# Patient Record
Sex: FEMALE | Race: BLACK OR AFRICAN AMERICAN | URBAN - METROPOLITAN AREA
[De-identification: names, ages, dates, MRNs, and addresses within clinical notes are randomized per-mention and may not be internally consistent; named-entity substitution may affect disease eponyms.]

---

## 2023-03-02 ENCOUNTER — OFFICE VISIT (OUTPATIENT)
Dept: URBAN - METROPOLITAN AREA CLINIC 74 | Facility: CLINIC | Age: 83
End: 2023-03-02
Payer: MEDICARE

## 2023-03-02 ENCOUNTER — LAB OUTSIDE AN ENCOUNTER (OUTPATIENT)
Dept: URBAN - METROPOLITAN AREA CLINIC 74 | Facility: CLINIC | Age: 83
End: 2023-03-02

## 2023-03-02 VITALS
SYSTOLIC BLOOD PRESSURE: 126 MMHG | TEMPERATURE: 98.2 F | DIASTOLIC BLOOD PRESSURE: 78 MMHG | BODY MASS INDEX: 30.21 KG/M2 | HEIGHT: 70 IN | HEART RATE: 78 BPM | WEIGHT: 211 LBS

## 2023-03-02 DIAGNOSIS — R07.89 COSTOCHONDRAL PAIN: ICD-10-CM

## 2023-03-02 DIAGNOSIS — Z79.01 CHRONIC ANTICOAGULATION: ICD-10-CM

## 2023-03-02 DIAGNOSIS — Z86.711 HISTORY OF PULMONARY EMBOLISM: ICD-10-CM

## 2023-03-02 DIAGNOSIS — R10.11 RUQ PAIN: ICD-10-CM

## 2023-03-02 DIAGNOSIS — K59.01 CONSTIPATION BY DELAYED COLONIC TRANSIT: ICD-10-CM

## 2023-03-02 PROBLEM — 161512007: Status: ACTIVE | Noted: 2023-03-02

## 2023-03-02 PROBLEM — 301717006: Status: ACTIVE | Noted: 2023-03-02

## 2023-03-02 PROBLEM — 88111009: Status: ACTIVE | Noted: 2023-03-02

## 2023-03-02 PROBLEM — 161973001: Status: ACTIVE | Noted: 2023-03-02

## 2023-03-02 PROBLEM — 448265002: Status: ACTIVE | Noted: 2023-03-02

## 2023-03-02 PROBLEM — 711150003: Status: ACTIVE | Noted: 2023-03-02

## 2023-03-02 PROCEDURE — 99204 OFFICE O/P NEW MOD 45 MIN: CPT | Performed by: INTERNAL MEDICINE

## 2023-03-02 PROCEDURE — 99244 OFF/OP CNSLTJ NEW/EST MOD 40: CPT | Performed by: INTERNAL MEDICINE

## 2023-03-02 NOTE — HPI-TODAY'S VISIT:
The patient is an 82-year-old female.  The patient was referred by Dr. Nadine Ballesteros for consultation, a copy of these document is being forwarded to the referring physician.  The patient presents to the office stating that for the past 10 to 12 months she has experienced discomfort over the right upper quadrant, along with it she has noticed that there is an uneven abdominal configuration and claims that there is some distention over the right upper hydrant, the patient did complain of discomfort when turning her chest, at times when taking a deep breath, the discomfort seems to be dull in all related to position, unrelated to defecation except for the slight bloating that improves after a good bowel movement.  The patient denied having any upper GI symptoms such as dysphagia, odynophagia, nausea, vomiting, heartburn.  She denied having any diarrhea but does state that she has to take laxatives to defecate, she has been taking Senokot to empty out her colon.  The patient claims that she had a colonoscopy many years ago certainly not in the last 10 years and is reluctant to have one in view of her age.  The patient had a recent right upper quadrant ultrasound performed on February 28, 2023 which revealed a normal liver, normal gallbladder, the portion of the pancreas seen was normal bile ducts were unremarkable, kidneys were normal and so was the spleen.  A CT of the chest performed December 7, 2022 failed to show any pulmonary emboli, there was no pleural or pericardial fluid, no significant hilar or mediastinal or axillary lymphadenopathy.  The patient was found to have bilateral thyroid nodules.  The upper abdomen revealed a 1 cm low-attenuation lesion in the posterior upper pole of the right kidney most probably benign.  The patient had diverticulosis in the descending colon with no evidence of diverticulitis.  Laboratory on December 2022 revealed a normal renal function, calcium 10.6 with a normal albumin normal EGFR, elevated triglycerides, the patient's H&H 14.8 and 46.3 with a normal white cell count and normal differential.  The patient will be scheduled to get a CT of the abdomen and pelvis with contrast, we will obtain a Hemosure, the patient will return for a follow-up visit after completion of testing.  It seems that most of the pain might be costochondral and muscle related.  We will deal with the constipation once we have the CT scan.

## 2023-03-03 ENCOUNTER — OFFICE VISIT (OUTPATIENT)
Dept: URBAN - METROPOLITAN AREA CLINIC 73 | Facility: CLINIC | Age: 83
End: 2023-03-03
Payer: MEDICARE

## 2023-03-03 DIAGNOSIS — K59.01 CONSTIPATION BY DELAYED COLONIC TRANSIT: ICD-10-CM

## 2023-03-03 PROCEDURE — G0328 FECAL BLOOD SCRN IMMUNOASSAY: HCPCS | Performed by: INTERNAL MEDICINE

## 2023-03-07 PROBLEM — 35298007: Status: ACTIVE | Noted: 2023-03-02

## 2023-04-20 ENCOUNTER — OFFICE VISIT (OUTPATIENT)
Dept: URBAN - METROPOLITAN AREA CLINIC 74 | Facility: CLINIC | Age: 83
End: 2023-04-20
Payer: MEDICARE

## 2023-04-20 VITALS
TEMPERATURE: 97.6 F | DIASTOLIC BLOOD PRESSURE: 66 MMHG | SYSTOLIC BLOOD PRESSURE: 142 MMHG | WEIGHT: 213.4 LBS | OXYGEN SATURATION: 97 % | HEART RATE: 67 BPM | HEIGHT: 70 IN | BODY MASS INDEX: 30.55 KG/M2

## 2023-04-20 DIAGNOSIS — Z86.711 HISTORY OF PULMONARY EMBOLISM: ICD-10-CM

## 2023-04-20 DIAGNOSIS — R19.4 CHANGE IN BOWEL HABIT: ICD-10-CM

## 2023-04-20 DIAGNOSIS — R07.89 COSTOCHONDRAL PAIN: ICD-10-CM

## 2023-04-20 DIAGNOSIS — K59.01 CONSTIPATION BY DELAYED COLONIC TRANSIT: ICD-10-CM

## 2023-04-20 DIAGNOSIS — Z79.01 CHRONIC ANTICOAGULATION: ICD-10-CM

## 2023-04-20 DIAGNOSIS — R10.811 RIGHT UPPER QUADRANT ABDOMINAL TENDERNESS WITHOUT REBOUND TENDERNESS: ICD-10-CM

## 2023-04-20 DIAGNOSIS — R10.11 RUQ PAIN: ICD-10-CM

## 2023-04-20 PROCEDURE — 99214 OFFICE O/P EST MOD 30 MIN: CPT | Performed by: INTERNAL MEDICINE

## 2023-04-20 NOTE — HPI-TODAY'S VISIT:
The patient is an 82-year-old female.  The patient was referred by Dr. Nadine Ballesteros for consultation, a copy of these document is being forwarded to the referring physician.  The patient presents to the office stating that for the past 10 to 12 months she has experienced discomfort over the right upper quadrant, along with it she has noticed that there is an uneven abdominal configuration and claims that there is some distention over the right upper hydrant, the patient did complain of discomfort when turning her chest, at times when taking a deep breath, the discomfort seems to be dull in all related to position, unrelated to defecation except for the slight bloating that improves after a good bowel movement.  The patient denied having any upper GI symptoms such as dysphagia, odynophagia, nausea, vomiting, heartburn.  She denied having any diarrhea but does state that she has to take laxatives to defecate, she has been taking Senokot to empty out her colon.  The patient claims that she had a colonoscopy many years ago certainly not in the last 10 years and is reluctant to have one in view of her age.  The patient had a recent right upper quadrant ultrasound performed on February 28, 2023 which revealed a normal liver, normal gallbladder, the portion of the pancreas seen was normal bile ducts were unremarkable, kidneys were normal and so was the spleen.  A CT of the chest performed December 7, 2022 failed to show any pulmonary emboli, there was no pleural or pericardial fluid, no significant hilar or mediastinal or axillary lymphadenopathy.  The patient was found to have bilateral thyroid nodules.  The upper abdomen revealed a 1 cm low-attenuation lesion in the posterior upper pole of the right kidney most probably benign.  The patient had diverticulosis in the descending colon with no evidence of diverticulitis.  Laboratory on December 2022 revealed a normal renal function, calcium 10.6 with a normal albumin normal EGFR, elevated triglycerides, the patient's H&H 14.8 and 46.3 with a normal white cell count and normal differential.  The patient will be scheduled to get a CT of the abdomen and pelvis with contrast, we will obtain a Hemosure, the patient will return for a follow-up visit after completion of testing.  It seems that most of the pain might be costochondral and muscle related.  We will deal with the constipation once we have the CT scan.  Today  April 20, 2023 the patient returns for a follow-up visit, the patient was last seen in the office on March 2, 2023 with right upper quadrant abdominal pain, right upper quadrant tenderness without rebound, costochondral pain, change in bowel habits, constipation due to delayed colonic transit, the patient has a history of pulmonary embolism on chronic anticoagulation.  At the time of the visit the patient complained of 10 to 12 months of discomfort over the right upper quadrant, she noticed that there was abdominal distention, she located the distention over the right upper quadrant, ithe pain radiated towards the chest, it occurred when taking deep breaths, it appeared to be dull and at times related to position, unrelated to defecation, the patient did complain of slight bloating which improved after a good bowel movement.  The patient denies having any upper GI symptoms such as dysphagia, odynophagia, nausea vomiting or heartburn.  The patient denies having any diarrhea but did take laxatives to defecate mostly Senokot.  The patient stated that she had a colonoscopy many years before most probably over 11 was reluctant to have a repeat colonoscopy in view of her age.  The patient had a right upper quadrant ultrasound performed February 28, 2023 which revealed a normal liver, normal gallbladder, a portion of the pancreas visualized was normal, bile ducts were unremarkable and the right kidney and spleen were normal.  A CT of the chest performed December 7, 2022 failed to show any pulmonary emboli, there was no pleural effusion or pericardial fluid, no hilar or mediastinal or axillary lymphadenopathy.  The patient did have bilateral thyroid nodules.  The upper abdomen revealed a 1 cm low-attenuation lesion in the posterior upper pole in the right kidney most probably benign cyst.  The patient had diverticulosis in the descending colon with no evidence of diverticulitis.  Laboratory in December 2022 revealed an H&H of 14.8 and 46.3 with a normal white cell count and normal differential, the patient had normal renal function, calcium 10.6.  The patient was scheduled to have a CT of the abdomen and pelvis with contrast, we ordered a Hemosure.  The Hemosure was performed on March 3, 2023 and it was negative, the CT was performed on March 24, 2023, it revealed mild lower lung disease, the liver, gallbladder, biliary tree, pancreas, spleen, adrenals, kidneys, bladder were normal, the stomach had a normal appearance and so did the small bowel and the colon, there was no evidence of any appendiceal inflammatory change, there was no celiac or mesenteric or aorto significant changes, the portal vein was patent, there was no lymphadenopathy.  Today the patient returns to the office stating that she was seen in the emergency room on April 3, 2023 with constipation and back pain.  At the time the patient had normal vital signs, complaint of abdominal discomfort, and lower back pain for 2 days.  The patient claimed that she was having difficulty having BMs despite having a prescription for lactulose to be taken 7.5 mL every 8 hours and Colace.  The patient complaint of some abdominal distention.  The patient had a CT of the abdomen and pelvis which revealed several hypodensities on the right kidney too small to be characterized possible cysts, a previous hysterectomy with slight extension of the left renal pelvis and proximal ureter although no stones were seen on the left ureter, there was a small oval fluid collection in the left aspect of the vagina, they could not clearly determine what it was and was advised to see gynecology.  Bowels appear to be normal, the patient had an old L2 compression fracture.  Laboratory at this time revealed a normal CBC, H&H 13.6 and 42.6 with a white cell count of 4.19.  The differential showed a slightly low monocyte count of 0.25 and absolute eos of 0.07.  Chemistries revealed a glucose of 110, creatinine 1.01 with a normal BUN of 12, the EGFR was slightly low at 56, the patient had a normal lipase of 16.  LFTs were normal and calcium was 10.5 with a albumin of 4.1.  During the CAT scan the patient also was identified to have a right pericardiac node slightly enlarged at 6 mm, there were no other enlarged axillary or mediastinal nodes.  The patient had atherosclerosis.  Today the patient states that she has increased her fiber intake and water intake and is defecating more regularly.  Ever since she has been feeling better.  The patient continues to complain of some unevenness on the fat in between the right upper quadrant and the left upper quadrant where she thinks that the amount of fat over the right upper quadrant is larger and that she can feel some induration which at times hurts.  I was unable to palpate any significant difference in between the left upper quadrant and the right upper quadrant but the fat distribution favors the right upper quadrant.  The patient has been recommended to return to primary care to have the very cardiac lymph nodes evaluated, she should be seen by OB/GYN for the fluid collection determined on the previous CT scan around the vagina and will remain on current medications and diet and will return to see us on an as-needed basis.

## 2023-05-04 ENCOUNTER — OFFICE VISIT (OUTPATIENT)
Dept: URBAN - METROPOLITAN AREA CLINIC 74 | Facility: CLINIC | Age: 83
End: 2023-05-04

## 2024-01-25 ENCOUNTER — DASHBOARD ENCOUNTERS (OUTPATIENT)
Age: 84
End: 2024-01-25

## 2024-01-29 ENCOUNTER — OFFICE VISIT (OUTPATIENT)
Dept: URBAN - METROPOLITAN AREA CLINIC 74 | Facility: CLINIC | Age: 84
End: 2024-01-29

## 2024-01-29 NOTE — HPI-TODAY'S VISIT:
The patient is an 82-year-old female.  The patient was referred by Dr. Nadine Ballesteros for consultation, a copy of these document is being forwarded to the referring physician.  The patient presents to the office stating that for the past 10 to 12 months she has experienced discomfort over the right upper quadrant, along with it she has noticed that there is an uneven abdominal configuration and claims that there is some distention over the right upper hydrant, the patient did complain of discomfort when turning her chest, at times when taking a deep breath, the discomfort seems to be dull in all related to position, unrelated to defecation except for the slight bloating that improves after a good bowel movement.  The patient denied having any upper GI symptoms such as dysphagia, odynophagia, nausea, vomiting, heartburn.  She denied having any diarrhea but does state that she has to take laxatives to defecate, she has been taking Senokot to empty out her colon.  The patient claims that she had a colonoscopy many years ago certainly not in the last 10 years and is reluctant to have one in view of her age.  The patient had a recent right upper quadrant ultrasound performed on February 28, 2023 which revealed a normal liver, normal gallbladder, the portion of the pancreas seen was normal bile ducts were unremarkable, kidneys were normal and so was the spleen.  A CT of the chest performed December 7, 2022 failed to show any pulmonary emboli, there was no pleural or pericardial fluid, no significant hilar or mediastinal or axillary lymphadenopathy.  The patient was found to have bilateral thyroid nodules.  The upper abdomen revealed a 1 cm low-attenuation lesion in the posterior upper pole of the right kidney most probably benign.  The patient had diverticulosis in the descending colon with no evidence of diverticulitis.  Laboratory on December 2022 revealed a normal renal function, calcium 10.6 with a normal albumin normal EGFR, elevated triglycerides, the patient's H&H 14.8 and 46.3 with a normal white cell count and normal differential.  The patient will be scheduled to get a CT of the abdomen and pelvis with contrast, we will obtain a Hemosure, the patient will return for a follow-up visit after completion of testing.  It seems that most of the pain might be costochondral and muscle related.  We will deal with the constipation once we have the CT scan.  Today  April 20, 2023 the patient returns for a follow-up visit, the patient was last seen in the office on March 2, 2023 with right upper quadrant abdominal pain, right upper quadrant tenderness without rebound, costochondral pain, change in bowel habits, constipation due to delayed colonic transit, the patient has a history of pulmonary embolism on chronic anticoagulation.  At the time of the visit the patient complained of 10 to 12 months of discomfort over the right upper quadrant, she noticed that there was abdominal distention, she located the distention over the right upper quadrant, ithe pain radiated towards the chest, it occurred when taking deep breaths, it appeared to be dull and at times related to position, unrelated to defecation, the patient did complain of slight bloating which improved after a good bowel movement.  The patient denies having any upper GI symptoms such as dysphagia, odynophagia, nausea vomiting or heartburn.  The patient denies having any diarrhea but did take laxatives to defecate mostly Senokot.  The patient stated that she had a colonoscopy many years before most probably over 11 was reluctant to have a repeat colonoscopy in view of her age.  The patient had a right upper quadrant ultrasound performed February 28, 2023 which revealed a normal liver, normal gallbladder, a portion of the pancreas visualized was normal, bile ducts were unremarkable and the right kidney and spleen were normal.  A CT of the chest performed December 7, 2022 failed to show any pulmonary emboli, there was no pleural effusion or pericardial fluid, no hilar or mediastinal or axillary lymphadenopathy.  The patient did have bilateral thyroid nodules.  The upper abdomen revealed a 1 cm low-attenuation lesion in the posterior upper pole in the right kidney most probably benign cyst.  The patient had diverticulosis in the descending colon with no evidence of diverticulitis.  Laboratory in December 2022 revealed an H&H of 14.8 and 46.3 with a normal white cell count and normal differential, the patient had normal renal function, calcium 10.6.  The patient was scheduled to have a CT of the abdomen and pelvis with contrast, we ordered a Hemosure.  The Hemosure was performed on March 3, 2023 and it was negative, the CT was performed on March 24, 2023, it revealed mild lower lung disease, the liver, gallbladder, biliary tree, pancreas, spleen, adrenals, kidneys, bladder were normal, the stomach had a normal appearance and so did the small bowel and the colon, there was no evidence of any appendiceal inflammatory change, there was no celiac or mesenteric or aorto significant changes, the portal vein was patent, there was no lymphadenopathy.  Today the patient returns to the office stating that she was seen in the emergency room on April 3, 2023 with constipation and back pain.  At the time the patient had normal vital signs, complaint of abdominal discomfort, and lower back pain for 2 days.  The patient claimed that she was having difficulty having BMs despite having a prescription for lactulose to be taken 7.5 mL every 8 hours and Colace.  The patient complaint of some abdominal distention.  The patient had a CT of the abdomen and pelvis which revealed several hypodensities on the right kidney too small to be characterized possible cysts, a previous hysterectomy with slight extension of the left renal pelvis and proximal ureter although no stones were seen on the left ureter, there was a small oval fluid collection in the left aspect of the vagina, they could not clearly determine what it was and was advised to see gynecology.  Bowels appear to be normal, the patient had an old L2 compression fracture.  Laboratory at this time revealed a normal CBC, H&H 13.6 and 42.6 with a white cell count of 4.19.  The differential showed a slightly low monocyte count of 0.25 and absolute eos of 0.07.  Chemistries revealed a glucose of 110, creatinine 1.01 with a normal BUN of 12, the EGFR was slightly low at 56, the patient had a normal lipase of 16.  LFTs were normal and calcium was 10.5 with a albumin of 4.1.  During the CAT scan the patient also was identified to have a right pericardiac node slightly enlarged at 6 mm, there were no other enlarged axillary or mediastinal nodes.  The patient had atherosclerosis.  Today the patient states that she has increased her fiber intake and water intake and is defecating more regularly.  Ever since she has been feeling better.  The patient continues to complain of some unevenness on the fat in between the right upper quadrant and the left upper quadrant where she thinks that the amount of fat over the right upper quadrant is larger and that she can feel some induration which at times hurts.  I was unable to palpate any significant difference in between the left upper quadrant and the right upper quadrant but the fat distribution favors the right upper quadrant.  The patient has been recommended to return to primary care to have the very cardiac lymph nodes evaluated, she should be seen by OB/GYN for the fluid collection determined on the previous CT scan around the vagina and will remain on current medications and diet and will return to see us on an as-needed basis.  Today January 29, 2024 the patient returns for a follow-up visit, the patient was last seen on April 20, 2023 with right upper quadrant abdominal pain, tenderness on the right upper quadrant, costochondral pain, change in bowel habits, constipation due to delayed colonic transit, history of pulmonary embolism on chronic anticoagulation.At the time of the last visit the patient stated that after increasing the fiber intake the patient started defecating regularly and was feeling better.  The patient did complain of unevenness on the abdominal fat in between the right upper quadrant and left upper quadrant thinking that the fat over the right upper quadrant was larger and on palpation felt some induration which at times is hard.  On my exam I was unable to find a significant difference. The fat distribution favors the right upper quadrant. The patient had a CT of the chest on March 2023 which revealed mildly enlarged main pulmonary outflow tract noted.  Possibly due to pulmonary arterial hypertension.  The patient was found to have colonic diverticulosis in the descending colon, there was a 2 cm low-attenuation lesion involving the right lobe of the thyroid, an ultrasound performed in 2019 revealed bilateral thyroid nodules the largest 1 on the right lobe measuring 1.8 x 1.4 cm. A CT of the abdomen and pelvis performed March 27, 2023 revealed a normal liver, gallbladder, biliary tree, pancreas, spleen, adrenals, kidneys, urinary bladder, normal colon small bowel stomach.  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 A Hemosure on March 2023 was negative.

## 2024-09-26 ENCOUNTER — OFFICE VISIT (OUTPATIENT)
Dept: URBAN - METROPOLITAN AREA CLINIC 74 | Facility: CLINIC | Age: 84
End: 2024-09-26
Payer: MEDICARE

## 2024-09-26 VITALS
BODY MASS INDEX: 30.21 KG/M2 | OXYGEN SATURATION: 95 % | SYSTOLIC BLOOD PRESSURE: 132 MMHG | WEIGHT: 211 LBS | HEIGHT: 70 IN | HEART RATE: 50 BPM | DIASTOLIC BLOOD PRESSURE: 80 MMHG

## 2024-09-26 DIAGNOSIS — Z79.01 CHRONIC ANTICOAGULATION: ICD-10-CM

## 2024-09-26 DIAGNOSIS — Z86.711 HISTORY OF PULMONARY EMBOLISM: ICD-10-CM

## 2024-09-26 DIAGNOSIS — R19.4 CHANGE IN BOWEL HABIT: ICD-10-CM

## 2024-09-26 DIAGNOSIS — R19.00 ABDOMINAL WALL BULGE: ICD-10-CM

## 2024-09-26 DIAGNOSIS — R10.11 RUQ PAIN: ICD-10-CM

## 2024-09-26 PROBLEM — 118928003: Status: ACTIVE | Noted: 2024-09-26

## 2024-09-26 PROCEDURE — 99213 OFFICE O/P EST LOW 20 MIN: CPT | Performed by: INTERNAL MEDICINE

## 2024-09-26 RX ORDER — TRAMADOL HYDROCHLORIDE 50 MG/1
1 TABLET AS NEEDED TABLET, FILM COATED ORAL ONCE A DAY
Status: ACTIVE | COMMUNITY

## 2024-09-26 NOTE — HPI-TODAY'S VISIT:
Today September 26, 2024 the patient returns for a follow-up visit, the patient was last seen on April 20, 2023 with right upper quadrant abdominal pain and tenderness, costochondral pain, change in bowel habit, constipation due to delayed colonic transit, history of pulmonary embolism on chronic anticoagulation.  At the time of the visit the patient stated that she had increased her fiber intake and water intake and was defecating more regularly.  Once she started defecating more frequently she was feeling better.  The patient did complain of some unevenness of the fat in the right upper quadrant and left upper quadrant where she thinks she had an extra amount of fat with some induration.  She claimed that that did hurt at times.  During the exam I was unable to palpate any difference between the left upper quadrant, the fat distribution appeared to favor the right upper quadrant.  At the time of the visit we advised the patient to see OB/GYN.  A CT of the abdomen and pelvis performed March 24, 2023 revealed a normal gallbladder with no gallstones, no biliary ductal dilation, the pancreas, spleen and adrenals kidneys were normal, the bladder was normal.  The patient has a normal stomach small bowel and colon, the appendix could not be seen, the uterus was absent the patient did not have any retroperitoneal or pelvic lymph nodes, celiac artery superior mesenteric artery portal vein were normal.  The patient's Hemosure on March 3, 2023 was negative.  Today the patient returns to the office stating that she noticed that the abdominal wall bulge on the right upper quadrant temporarily increased in size and gave her slight abdominal discomfort, since then the patient's bulge has gone back to the previous size and no longer has any discomfort.  The patient had been seen by general surgery and told that she basically had an abdominal wall bulge and that there was no specific treatment for it.  When having discomfort she is able to improve by wearing a girdle.  Today the abdomen is not distended soft nontender with no palpable masses.  Bowel sounds are normal.  I reviewed the most recent CT of the abdomen chest and ultrasound, there is no evidence of any intestinal obstruction, no obvious mass.  The patient has been advised to wear to gargle as needed and will return for a follow-up visit on a as needed basis.

## 2024-09-26 NOTE — PHYSICAL EXAM GASTROINTESTINAL
Abdomen , soft, nontender, nondistended , no guarding or rigidity , no masses palpable , normal bowel sounds , Liver and Spleen,  no hepatosplenomegaly , liver nontender, right upper quadrant bulge.